# Patient Record
Sex: MALE | Race: WHITE | NOT HISPANIC OR LATINO | Employment: FULL TIME | URBAN - METROPOLITAN AREA
[De-identification: names, ages, dates, MRNs, and addresses within clinical notes are randomized per-mention and may not be internally consistent; named-entity substitution may affect disease eponyms.]

---

## 2022-11-18 ENCOUNTER — APPOINTMENT (OUTPATIENT)
Dept: RADIOLOGY | Facility: MEDICAL CENTER | Age: 47
End: 2022-11-18
Attending: EMERGENCY MEDICINE

## 2022-11-18 ENCOUNTER — HOSPITAL ENCOUNTER (EMERGENCY)
Facility: MEDICAL CENTER | Age: 47
End: 2022-11-18
Attending: EMERGENCY MEDICINE

## 2022-11-18 VITALS
OXYGEN SATURATION: 96 % | DIASTOLIC BLOOD PRESSURE: 96 MMHG | HEIGHT: 72 IN | RESPIRATION RATE: 18 BRPM | TEMPERATURE: 97.9 F | WEIGHT: 256.17 LBS | BODY MASS INDEX: 34.7 KG/M2 | HEART RATE: 89 BPM | SYSTOLIC BLOOD PRESSURE: 138 MMHG

## 2022-11-18 DIAGNOSIS — S83.92XA SPRAIN OF LEFT KNEE, UNSPECIFIED LIGAMENT, INITIAL ENCOUNTER: ICD-10-CM

## 2022-11-18 DIAGNOSIS — S76.112A QUADRICEPS MUSCLE STRAIN, LEFT, INITIAL ENCOUNTER: ICD-10-CM

## 2022-11-18 PROCEDURE — A9270 NON-COVERED ITEM OR SERVICE: HCPCS | Performed by: EMERGENCY MEDICINE

## 2022-11-18 PROCEDURE — 700102 HCHG RX REV CODE 250 W/ 637 OVERRIDE(OP): Performed by: EMERGENCY MEDICINE

## 2022-11-18 PROCEDURE — 73560 X-RAY EXAM OF KNEE 1 OR 2: CPT | Mod: LT

## 2022-11-18 PROCEDURE — 99284 EMERGENCY DEPT VISIT MOD MDM: CPT

## 2022-11-18 RX ORDER — KETOROLAC TROMETHAMINE 30 MG/ML
60 INJECTION, SOLUTION INTRAMUSCULAR; INTRAVENOUS ONCE
Status: COMPLETED | OUTPATIENT
Start: 2022-11-18 | End: 2022-11-18

## 2022-11-18 RX ORDER — METHOCARBAMOL 750 MG/1
750 TABLET, FILM COATED ORAL 4 TIMES DAILY
Qty: 40 TABLET | Refills: 0 | Status: SHIPPED | OUTPATIENT
Start: 2022-11-18 | End: 2022-11-19 | Stop reason: SDUPTHER

## 2022-11-18 RX ORDER — OXYCODONE HCL 10 MG/1
10 TABLET, FILM COATED, EXTENDED RELEASE ORAL EVERY 12 HOURS
Qty: 20 TABLET | Refills: 0 | Status: SHIPPED | OUTPATIENT
Start: 2022-11-18 | End: 2022-11-19 | Stop reason: SDUPTHER

## 2022-11-18 RX ORDER — KETOROLAC TROMETHAMINE 10 MG/1
10 TABLET, FILM COATED ORAL 3 TIMES DAILY PRN
Qty: 15 TABLET | Refills: 0 | Status: SHIPPED | OUTPATIENT
Start: 2022-11-18 | End: 2022-11-19 | Stop reason: SDUPTHER

## 2022-11-18 RX ORDER — OXYCODONE HCL 10 MG/1
10 TABLET, FILM COATED, EXTENDED RELEASE ORAL ONCE
Status: COMPLETED | OUTPATIENT
Start: 2022-11-18 | End: 2022-11-18

## 2022-11-18 RX ADMIN — OXYCODONE HYDROCHLORIDE 10 MG: 10 TABLET, FILM COATED, EXTENDED RELEASE ORAL at 22:11

## 2022-11-19 RX ORDER — OXYCODONE HCL 10 MG/1
10 TABLET, FILM COATED, EXTENDED RELEASE ORAL EVERY 12 HOURS
Qty: 20 TABLET | Refills: 0 | Status: SHIPPED | OUTPATIENT
Start: 2022-11-19 | End: 2022-11-29

## 2022-11-19 RX ORDER — METHOCARBAMOL 750 MG/1
750 TABLET, FILM COATED ORAL 4 TIMES DAILY
Qty: 40 TABLET | Refills: 0 | Status: SHIPPED | OUTPATIENT
Start: 2022-11-19

## 2022-11-19 RX ORDER — KETOROLAC TROMETHAMINE 10 MG/1
10 TABLET, FILM COATED ORAL 3 TIMES DAILY PRN
Qty: 15 TABLET | Refills: 0 | Status: SHIPPED | OUTPATIENT
Start: 2022-11-19

## 2022-11-19 NOTE — ED TRIAGE NOTES
"Chief Complaint   Patient presents with    Knee Pain     Previous acl repair to left knee Jan 16, 2022, was out on ice and injured his L Knee.  Pain radiates from knee to upper thigh.  Reports swelling, warm to touch, sensitive.  Tried hot water and ibuprofen w/o relief.         BP (!) 162/105   Pulse 76   Temp 36.6 °C (97.9 °F) (Temporal)   Resp 18   Ht 1.816 m (5' 11.5\")   Wt 116 kg (256 lb 2.8 oz)   SpO2 100%   BMI 35.23 kg/m²     "

## 2022-11-19 NOTE — ED PROVIDER NOTES
"CHIEF COMPLAINT  Chief Complaint   Patient presents with    Knee Pain     Previous acl repair to left knee Jan 16, 2022, was out on ice and injured his L Knee.  Pain radiates from knee to upper thigh.  Reports swelling, warm to touch, sensitive.  Tried hot water and ibuprofen w/o relief.         HPI  Evie Gonsales is a 47 y.o. male who presents tonight with his significant other with a chief complaint of left knee pain and left lateral thigh pain after he slipped on the ice today and tried to catch himself nearly did the splits and then felt pain in his lateral thigh.  He did not land on his knee but just had recent surgery on his ACL in [facility] where he currently resides.  He is out here on a lengthy work stay at [employer].  He denies striking his head.  He has no back pain, no distal paresthesias.    REVIEW OF SYSTEMS  See HPI for further details. All other system reviews are negative.    PAST MEDICAL HISTORY  Past Medical History:   Diagnosis Date    Patient denies medical problems        FAMILY HISTORY  No family history on file.    SOCIAL HISTORY  Social History     Socioeconomic History    Marital status:        SURGICAL HISTORY  Past Surgical History:   Procedure Laterality Date    REPAIR, KNEE, ACL Left 01/16/2022       CURRENT MEDICATIONS  See nurses notes    ALLERGIES  Allergies   Allergen Reactions    Tylenol Itching       PHYSICAL EXAM  VITAL SIGNS: BP (!) 138/96   Pulse 89   Temp 36.6 °C (97.9 °F) (Temporal)   Resp 18   Ht 1.816 m (5' 11.5\")   Wt 116 kg (256 lb 2.8 oz)   SpO2 96%   BMI 35.23 kg/m²     Constitutional: Patient is well developed, well nourished in moderate distress.  HENT: Normocephalic, atraumatic,  Oropharynx moist without erythema or exudates, nose normal .  Eyes: PERRL, EOMI  Neck: Supple with no tenderness, normal range of motion.  Cardiovascular: Normal heart rate and rhythm. No murmur  Thorax & Lungs: Clear and equal breath sounds with good " excursion. No respiratory distress  Abdomen: Bowel sounds normal in all four quadrants. Soft,nontender  Skin: Warm, Dry, No erythema, No rashes.  Extremities: Peripheral pulses 4/4, palpable joint effusion on the left with ecchymotic changes.  There is tenderness in the left lateral quadricep muscles all the way up to the groin.  There are no contusions or abrasions.  Neurovascular is intact, he has good capillary refill, good distal sensation with limited range of motion secondary to pain.  Musculoskeletal: Normal range of motion in all major joints.  No thoracolumbar tenderness.  Neurologic: Alert & oriented x 3, Normal motor function, Normal sensory function, No lateralizing or focal deficits noted.   Psychiatric: Affect normal, Judgment normal, Mood normal.         RADIOLOGY/PROCEDURES  DX-KNEE 2- LEFT   Final Result      1.  Negative for fracture or malalignment      2.  Changes consistent with prior anterior cruciate ligament repair      3.  Probable small joint effusion            COURSE & MEDICAL DECISION MAKING  Pertinent Labs & Imaging studies reviewed. (See chart for details)  X-ray performed left knee shows no acute fracture or subluxation.  There is a small effusion.  Patient was given a knee immobilizer and crutches as well as OxyContin here in the ER and prescriptions for Toradol, OxyContin and Robaxin for home.  He is to be off of his knee for the next 2 to 3 days with minimal weightbearing and use the crutches at all times.  Ice and elevate for the next 24 hours then moist heat or hot tub soaks in Epsom salts, follow-up with his primary care provider or orthopedic surgeon when he returns back to [facility] where he is currently from.  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  His plan is to leave on Monday to drive back to [place].  I strongly emphasized that they need to stop every couple hours so that he can move around to avoid any  potential DVT.    FINAL IMPRESSION  1.  Left knee sprain  2.  Quadricep muscle strain with possible tear  3.         Electronically signed by: Carolina Arguelles D.O., 11/18/2022 10:25 PM ED Provider Note

## 2022-11-19 NOTE — ED NOTES
Discharge instructions given and discussed. RX sent to preferred pharmacy,  and pt educated. Pt educated to come back to ER for new or worsening symptoms and follow up with orthopedic as instructed. Pt verbalized understanding. VSS. Pt  Discharged in stable condition.

## 2022-11-19 NOTE — DISCHARGE INSTRUCTIONS
Rest, ice and elevate for the next 2 to 3 days, no weightbearing for that extended period of time, wear the knee immobilizer at all times except for bathing.  When you start your journey back to [place] you will need to make sure that you are getting up and moving around every 2 hours from the vehicle to avoid getting any blood clots in your leg.  Ice for 24 hours and then apply warm moist compresses.  Take the medications I am prescribing you as directed with food and stool softeners.  Call first thing Monday morning to make an appt with your doctor back in [place]